# Patient Record
Sex: FEMALE | Race: WHITE | Employment: FULL TIME | URBAN - METROPOLITAN AREA
[De-identification: names, ages, dates, MRNs, and addresses within clinical notes are randomized per-mention and may not be internally consistent; named-entity substitution may affect disease eponyms.]

---

## 2017-01-09 ENCOUNTER — OFFICE VISIT (OUTPATIENT)
Dept: NEUROLOGY | Facility: CLINIC | Age: 52
End: 2017-01-09

## 2017-01-09 VITALS
HEIGHT: 66 IN | BODY MASS INDEX: 23.3 KG/M2 | HEART RATE: 76 BPM | SYSTOLIC BLOOD PRESSURE: 112 MMHG | WEIGHT: 145 LBS | RESPIRATION RATE: 16 BRPM | DIASTOLIC BLOOD PRESSURE: 60 MMHG

## 2017-01-09 DIAGNOSIS — M79.18 MYOFASCIAL PAIN SYNDROME, CERVICAL: Primary | ICD-10-CM

## 2017-01-09 PROCEDURE — 96372 THER/PROPH/DIAG INJ SC/IM: CPT | Performed by: OTHER

## 2017-01-09 PROCEDURE — 20553 NJX 1/MLT TRIGGER POINTS 3/>: CPT | Performed by: OTHER

## 2017-01-09 RX ORDER — BUPIVACAINE HYDROCHLORIDE 5 MG/ML
8 INJECTION, SOLUTION PERINEURAL ONCE
Status: COMPLETED | OUTPATIENT
Start: 2017-01-09 | End: 2017-01-09

## 2017-01-09 RX ORDER — TRIAMCINOLONE ACETONIDE 40 MG/ML
40 INJECTION, SUSPENSION INTRA-ARTICULAR; INTRAMUSCULAR ONCE
Status: COMPLETED | OUTPATIENT
Start: 2017-01-09 | End: 2017-01-09

## 2017-01-09 NOTE — PATIENT INSTRUCTIONS
Refill policies:    • Allow 2 business days for refills; controlled substances may take longer.   • Contact your pharmacy at least 5 days prior to running out of medication and have them send an electronic request or submit request through the “request re your physician has recommended that you have a procedure or additional testing performed. [facility] will contact your insurance carrier to obtain pre-certification or prior authorization.     Unfortunately, GREGORIO has seen an increas

## 2017-01-09 NOTE — PROGRESS NOTES
The trigger point injections have helped significantly and although the pain is much better in the neck and shoulder blades, the muscles in the occipitalis as tender and tight now    PROCEDURE NOTES: The patient was positioned in the most comfortable choic

## 2017-01-25 ENCOUNTER — TELEPHONE (OUTPATIENT)
Dept: NEUROLOGY | Facility: CLINIC | Age: 52
End: 2017-01-25

## 2017-01-26 PROCEDURE — 88377 M/PHMTRC ALYS ISHQUANT/SEMIQ: CPT | Performed by: RADIOLOGY

## 2017-01-26 PROCEDURE — 88305 TISSUE EXAM BY PATHOLOGIST: CPT | Performed by: RADIOLOGY

## 2017-01-26 PROCEDURE — 88360 TUMOR IMMUNOHISTOCHEM/MANUAL: CPT | Performed by: RADIOLOGY

## 2017-01-26 NOTE — TELEPHONE ENCOUNTER
Great news, tell her that we can do once every 2-4 weeks Trigger point series for 3-4 times if pain recurs.   Usually at the end of 3-4th injection, it is hoped the pain generator \"reset\" button shuts down the level of pain

## 2017-02-15 ENCOUNTER — OFFICE VISIT (OUTPATIENT)
Dept: NEUROLOGY | Facility: CLINIC | Age: 52
End: 2017-02-15

## 2017-02-15 VITALS
SYSTOLIC BLOOD PRESSURE: 107 MMHG | DIASTOLIC BLOOD PRESSURE: 60 MMHG | HEIGHT: 66.5 IN | BODY MASS INDEX: 23.03 KG/M2 | RESPIRATION RATE: 16 BRPM | WEIGHT: 145 LBS | HEART RATE: 77 BPM

## 2017-02-15 DIAGNOSIS — M79.18 MYOFASCIAL PAIN SYNDROME, CERVICAL: Primary | ICD-10-CM

## 2017-02-15 PROCEDURE — 20553 NJX 1/MLT TRIGGER POINTS 3/>: CPT | Performed by: OTHER

## 2017-02-15 PROCEDURE — 96372 THER/PROPH/DIAG INJ SC/IM: CPT | Performed by: OTHER

## 2017-02-15 RX ORDER — TRIAMCINOLONE ACETONIDE 40 MG/ML
40 INJECTION, SUSPENSION INTRA-ARTICULAR; INTRAMUSCULAR ONCE
Status: COMPLETED | OUTPATIENT
Start: 2017-02-15 | End: 2017-02-15

## 2017-02-15 RX ORDER — BUPIVACAINE HYDROCHLORIDE 5 MG/ML
8 INJECTION, SOLUTION PERINEURAL ONCE
Status: COMPLETED | OUTPATIENT
Start: 2017-02-15 | End: 2017-02-15

## 2017-02-15 NOTE — PROCEDURES
@EXB@  Procedure:  Trigger Point Injections  NOTES:   Improved after the last injection and wants to continue although pain is not as bad as pre-TPI    MTP identified right mid cervical upper trapezius and interscapular area,    /60 mmHg  Pulse 77  R

## 2017-02-15 NOTE — PATIENT INSTRUCTIONS
Refill policies:    • Allow 2 business days for refills; controlled substances may take longer.   • Contact your pharmacy at least 5 days prior to running out of medication and have them send an electronic request or submit request through the “request re your physician has recommended that you have a procedure or additional testing performed. [facility] will contact your insurance carrier to obtain pre-certification or prior authorization.     Unfortunately, GREGORIO has seen an increas

## 2017-04-14 ENCOUNTER — OFFICE VISIT (OUTPATIENT)
Dept: NEUROLOGY | Facility: CLINIC | Age: 52
End: 2017-04-14

## 2017-04-14 VITALS
SYSTOLIC BLOOD PRESSURE: 114 MMHG | RESPIRATION RATE: 16 BRPM | WEIGHT: 145 LBS | BODY MASS INDEX: 23.03 KG/M2 | DIASTOLIC BLOOD PRESSURE: 65 MMHG | HEART RATE: 97 BPM | HEIGHT: 66.5 IN

## 2017-04-14 DIAGNOSIS — M79.18 MYOFASCIAL PAIN SYNDROME, CERVICAL: Primary | ICD-10-CM

## 2017-04-14 PROCEDURE — 96372 THER/PROPH/DIAG INJ SC/IM: CPT | Performed by: OTHER

## 2017-04-14 PROCEDURE — 20553 NJX 1/MLT TRIGGER POINTS 3/>: CPT | Performed by: OTHER

## 2017-04-14 RX ORDER — BUPIVACAINE HYDROCHLORIDE 5 MG/ML
5 INJECTION, SOLUTION PERINEURAL ONCE
Status: COMPLETED | OUTPATIENT
Start: 2017-04-14 | End: 2017-04-14

## 2017-04-14 RX ORDER — TRIAMCINOLONE ACETONIDE 40 MG/ML
40 INJECTION, SUSPENSION INTRA-ARTICULAR; INTRAMUSCULAR ONCE
Status: COMPLETED | OUTPATIENT
Start: 2017-04-14 | End: 2017-04-14

## 2017-04-14 NOTE — PATIENT INSTRUCTIONS
Refill policies:    • Allow 2 business days for refills; controlled substances may take longer.   • Contact your pharmacy at least 5 days prior to running out of medication and have them send an electronic request or submit request through the “request re insurance carrier to obtain pre-certification or prior authorization. Unfortunately, GREGORIO has seen an increase in denial of payment even though the procedure/test has been pre-certified.   You are strongly encouraged to contact your insurance carrier to v

## 2017-04-14 NOTE — PROGRESS NOTES
@YWB@  Procedure:  Trigger Point Injections  NOTES: bilateral occipitalis muscle tenderness    /65 mmHg  Pulse 97  Resp 16  Ht 66.5\"  Wt 145 lb  BMI 23.06 kg/m2  PROCEDURE NOTES: The patient was positioned in the most comfortable choice optimal for

## 2017-06-07 RX ORDER — NORTRIPTYLINE HYDROCHLORIDE 10 MG/1
CAPSULE ORAL
Qty: 30 CAPSULE | Refills: 1 | Status: SHIPPED | OUTPATIENT
Start: 2017-06-07 | End: 2017-09-11

## 2017-06-07 NOTE — TELEPHONE ENCOUNTER
Medication: Nortiptyline    Date of last refill: 11/10/16  [place]    Last office visit: 04/14/17  Due back to clinic per last office note:  3 months  Date next office visit scheduled: No appointment scheduled at [facility]

## 2017-09-11 ENCOUNTER — TELEPHONE (OUTPATIENT)
Dept: NEUROLOGY | Facility: CLINIC | Age: 52
End: 2017-09-11

## 2017-09-11 DIAGNOSIS — M79.18 MYOFASCIAL PAIN SYNDROME, CERVICAL: Primary | ICD-10-CM

## 2017-09-11 RX ORDER — NORTRIPTYLINE HYDROCHLORIDE 10 MG/1
CAPSULE ORAL
Qty: 30 CAPSULE | Refills: 1 | Status: SHIPPED | OUTPATIENT
Start: 2017-09-11 | End: 2017-11-01

## 2017-09-11 NOTE — TELEPHONE ENCOUNTER
Called [facility] and spoke with John Perez for [address] [facility] codes are valid and billable no authorization or predetermination is needed. Call reference #[id number].  Time on call 28:53

## 2017-09-11 NOTE — TELEPHONE ENCOUNTER
Michelle Dad reports that she discontinued nortriptyline and had a \"big depression drop\". Unsure if was related to her stopping nortriptyline or co-morbid cancer issue.     She reports that her neck has started hurting again; would like to resume nortriptyline a

## 2017-09-12 ENCOUNTER — OFFICE VISIT (OUTPATIENT)
Dept: NEUROLOGY | Facility: CLINIC | Age: 52
End: 2017-09-12

## 2017-09-12 VITALS — SYSTOLIC BLOOD PRESSURE: 92 MMHG | HEART RATE: 80 BPM | RESPIRATION RATE: 18 BRPM | DIASTOLIC BLOOD PRESSURE: 56 MMHG

## 2017-09-12 DIAGNOSIS — M79.18 MYOFASCIAL PAIN SYNDROME, CERVICAL: Primary | ICD-10-CM

## 2017-09-12 PROCEDURE — 20553 NJX 1/MLT TRIGGER POINTS 3/>: CPT | Performed by: OTHER

## 2017-09-12 RX ORDER — TRIAMCINOLONE ACETONIDE 40 MG/ML
40 INJECTION, SUSPENSION INTRA-ARTICULAR; INTRAMUSCULAR ONCE
Status: COMPLETED | OUTPATIENT
Start: 2017-09-12 | End: 2017-09-12

## 2017-09-12 RX ADMIN — TRIAMCINOLONE ACETONIDE 40 MG: 40 INJECTION, SUSPENSION INTRA-ARTICULAR; INTRAMUSCULAR at 09:00:00

## 2017-09-12 NOTE — PATIENT INSTRUCTIONS
Refill policies:    • Allow 2-3 business days for refills; controlled substances may take longer.   • Contact your pharmacy at least 5 days prior to running out of medication and have them send an electronic request or submit request through the [facility] have a procedure or additional testing performed. [facility]) will contact your insurance carrier to obtain pre-certification or prior authorization.     Unfortunately, GREGORIO has seen an increase in denial of payment even though the p

## 2017-09-12 NOTE — PROGRESS NOTES
[facility]  9/12/2017    Followed for neck and head pains:    She had been doing relatively better after the last injections.   Specially the interscapular pains, these are all gone HOWEVER, she is experiencing pain in the back of the he complications noted or related by patient. Post TPI instructions given: watch out for signs of inflammation or infection and report right away.       Cecil Leonard MD  Vascular & General Neurology  Director, Multiple Sclerosis Program  [facility]

## 2017-11-01 DIAGNOSIS — M79.18 MYOFASCIAL PAIN SYNDROME, CERVICAL: ICD-10-CM

## 2017-11-01 RX ORDER — NORTRIPTYLINE HYDROCHLORIDE 10 MG/1
CAPSULE ORAL
Qty: 30 CAPSULE | Refills: 0 | Status: SHIPPED | OUTPATIENT
Start: 2017-11-01 | End: 2017-12-12

## 2017-11-01 NOTE — TELEPHONE ENCOUNTER
Medication: Nortriptyline 10 mg    Date of last refill: 09/11/17 with 1 addt refill  [place]     Last office visit: 9/12/2017  Due back to clinic per last office note:  RTN in 1 month  Date next office visit scheduled:

## 2017-11-14 ENCOUNTER — OFFICE VISIT (OUTPATIENT)
Dept: NEUROLOGY | Facility: CLINIC | Age: 52
End: 2017-11-14

## 2017-11-14 VITALS — HEART RATE: 80 BPM | DIASTOLIC BLOOD PRESSURE: 60 MMHG | RESPIRATION RATE: 16 BRPM | SYSTOLIC BLOOD PRESSURE: 90 MMHG

## 2017-11-14 DIAGNOSIS — M79.18 MYOFASCIAL PAIN SYNDROME, CERVICAL: Primary | ICD-10-CM

## 2017-11-14 PROCEDURE — 20553 NJX 1/MLT TRIGGER POINTS 3/>: CPT | Performed by: OTHER

## 2017-11-14 RX ORDER — NABUMETONE 750 MG/1
750 TABLET, FILM COATED ORAL 2 TIMES DAILY
Qty: 60 TABLET | Refills: 3 | Status: SHIPPED | OUTPATIENT
Start: 2017-11-14 | End: 2018-12-11

## 2017-11-14 RX ORDER — TAMOXIFEN CITRATE 20 MG/1
20 TABLET ORAL DAILY
Refills: 3 | COMMUNITY
Start: 2017-10-22 | End: 2020-01-02

## 2017-11-14 RX ORDER — CARVEDILOL 3.12 MG/1
TABLET ORAL
Refills: 11 | COMMUNITY
Start: 2017-10-24 | End: 2018-12-11

## 2017-11-14 RX ORDER — BUPIVACAINE HYDROCHLORIDE 5 MG/ML
8 INJECTION, SOLUTION PERINEURAL ONCE
Status: COMPLETED | OUTPATIENT
Start: 2017-11-14 | End: 2017-11-14

## 2017-11-14 RX ORDER — TRIAMCINOLONE ACETONIDE 40 MG/ML
40 INJECTION, SUSPENSION INTRA-ARTICULAR; INTRAMUSCULAR ONCE
Status: COMPLETED | OUTPATIENT
Start: 2017-11-14 | End: 2017-11-14

## 2017-11-14 NOTE — PROGRESS NOTES
[facility]  11/14/2017      Procedure:  Trigger Point Injections  NOTES:   Bilateral upper trapezius, bilateral interscapular area, suboccipital muscles and the occipitalis muscles bilaterally    BP 90/60   Pulse 80   Resp 16   PROCEDUR

## 2017-11-14 NOTE — PATIENT INSTRUCTIONS
Refill policies:    • Allow 2-3 business days for refills; controlled substances may take longer.   • Contact your pharmacy at least 5 days prior to running out of medication and have them send an electronic request or submit request through the [facility] have a procedure or additional testing performed. [facility]) will contact your insurance carrier to obtain pre-certification or prior authorization.     Unfortunately, GREGORIO has seen an increase in denial of payment even though the p

## 2017-12-12 DIAGNOSIS — M79.18 MYOFASCIAL PAIN SYNDROME, CERVICAL: ICD-10-CM

## 2017-12-12 RX ORDER — NORTRIPTYLINE HYDROCHLORIDE 10 MG/1
CAPSULE ORAL
Qty: 30 CAPSULE | Refills: 5 | Status: SHIPPED | OUTPATIENT
Start: 2017-12-12 | End: 2018-06-16

## 2017-12-12 NOTE — TELEPHONE ENCOUNTER
Medication: Nortriptyline 10 mg    Date of last refill: 11/01/17  [place]     Last office visit: 11/14/2017  Due back to clinic per last office note:  RTN in 1 month  Date next office visit scheduled:  Future Appointment

## 2017-12-13 ENCOUNTER — OFFICE VISIT (OUTPATIENT)
Dept: NEUROLOGY | Facility: CLINIC | Age: 52
End: 2017-12-13

## 2017-12-13 VITALS
BODY MASS INDEX: 23.03 KG/M2 | WEIGHT: 145 LBS | RESPIRATION RATE: 16 BRPM | SYSTOLIC BLOOD PRESSURE: 100 MMHG | DIASTOLIC BLOOD PRESSURE: 60 MMHG | HEART RATE: 72 BPM | HEIGHT: 66.5 IN

## 2017-12-13 DIAGNOSIS — M79.18 MYOFASCIAL PAIN SYNDROME, CERVICAL: Primary | ICD-10-CM

## 2017-12-13 PROCEDURE — 20553 NJX 1/MLT TRIGGER POINTS 3/>: CPT | Performed by: OTHER

## 2017-12-13 PROCEDURE — 97035 APP MDLTY 1+ULTRASOUND EA 15: CPT | Performed by: OTHER

## 2017-12-13 RX ORDER — LISINOPRIL 5 MG/1
5 TABLET ORAL DAILY
COMMUNITY
End: 2018-12-11

## 2017-12-13 RX ORDER — BUPIVACAINE HYDROCHLORIDE 5 MG/ML
8 INJECTION, SOLUTION PERINEURAL ONCE
Status: COMPLETED | OUTPATIENT
Start: 2017-12-13 | End: 2017-12-13

## 2017-12-13 RX ORDER — TRIAMCINOLONE ACETONIDE 40 MG/ML
40 INJECTION, SUSPENSION INTRA-ARTICULAR; INTRAMUSCULAR ONCE
Status: COMPLETED | OUTPATIENT
Start: 2017-12-13 | End: 2017-12-13

## 2017-12-13 NOTE — PROGRESS NOTES
[facility]  12/13/2017      Procedure:  Trigger Point Injections  NOTES:   Upper traps bilaterally and bilateral interscapular area    /60   Pulse 72   Resp 16   Ht 66.5\"   Wt 145 lb   BMI 23.05 kg/m²   PROCEDURE NOTES: The lorena

## 2017-12-13 NOTE — PATIENT INSTRUCTIONS
Refill policies:    • Allow 2-3 business days for refills; controlled substances may take longer.   • Contact your pharmacy at least 5 days prior to running out of medication and have them send an electronic request or submit request through the [facility] have a procedure or additional testing performed. [facility]) will contact your insurance carrier to obtain pre-certification or prior authorization.     Unfortunately, GREGORIO has seen an increase in denial of payment even though the p

## 2018-01-10 ENCOUNTER — OFFICE VISIT (OUTPATIENT)
Dept: NEUROLOGY | Facility: CLINIC | Age: 53
End: 2018-01-10

## 2018-01-10 VITALS
HEIGHT: 66.5 IN | DIASTOLIC BLOOD PRESSURE: 60 MMHG | RESPIRATION RATE: 16 BRPM | SYSTOLIC BLOOD PRESSURE: 100 MMHG | WEIGHT: 145 LBS | BODY MASS INDEX: 23.03 KG/M2 | HEART RATE: 72 BPM

## 2018-01-10 DIAGNOSIS — M79.18 MYOFASCIAL PAIN SYNDROME, CERVICAL: Primary | ICD-10-CM

## 2018-01-10 PROCEDURE — 20553 NJX 1/MLT TRIGGER POINTS 3/>: CPT | Performed by: OTHER

## 2018-01-10 RX ORDER — TRIAMCINOLONE ACETONIDE 40 MG/ML
40 INJECTION, SUSPENSION INTRA-ARTICULAR; INTRAMUSCULAR ONCE
Status: COMPLETED | OUTPATIENT
Start: 2018-01-10 | End: 2018-01-10

## 2018-01-10 RX ORDER — BUPIVACAINE HYDROCHLORIDE 5 MG/ML
8 INJECTION, SOLUTION PERINEURAL ONCE
Status: COMPLETED | OUTPATIENT
Start: 2018-01-10 | End: 2018-01-10

## 2018-01-10 RX ADMIN — TRIAMCINOLONE ACETONIDE 40 MG: 40 INJECTION, SUSPENSION INTRA-ARTICULAR; INTRAMUSCULAR at 11:50:00

## 2018-01-10 NOTE — PROCEDURES
[facility]  1/10/2018      Procedure:  Trigger Point Injections  NOTES:   Bilateral upper trapezius and right interscapular area    /60   Pulse 72   Resp 16   Ht 66.5\"   Wt 145 lb   BMI 23.05 kg/m²   PROCEDURE NOTES: The patient

## 2018-01-10 NOTE — PATIENT INSTRUCTIONS
Refill policies:    • Allow 2-3 business days for refills; controlled substances may take longer.   • Contact your pharmacy at least 5 days prior to running out of medication and have them send an electronic request or submit request through the [facility] have a procedure or additional testing performed. [facility]) will contact your insurance carrier to obtain pre-certification or prior authorization.     Unfortunately, GREGORIO has seen an increase in denial of payment even though the p

## 2018-05-25 ENCOUNTER — OFFICE VISIT (OUTPATIENT)
Dept: NEUROLOGY | Facility: CLINIC | Age: 53
End: 2018-05-25

## 2018-05-25 VITALS
HEIGHT: 66 IN | RESPIRATION RATE: 16 BRPM | HEART RATE: 72 BPM | BODY MASS INDEX: 23.78 KG/M2 | DIASTOLIC BLOOD PRESSURE: 60 MMHG | SYSTOLIC BLOOD PRESSURE: 100 MMHG | WEIGHT: 148 LBS

## 2018-05-25 DIAGNOSIS — M79.18 MYOFASCIAL PAIN SYNDROME, CERVICAL: Primary | ICD-10-CM

## 2018-05-25 PROCEDURE — 20553 NJX 1/MLT TRIGGER POINTS 3/>: CPT | Performed by: OTHER

## 2018-05-25 RX ORDER — TRIAMCINOLONE ACETONIDE 40 MG/ML
40 INJECTION, SUSPENSION INTRA-ARTICULAR; INTRAMUSCULAR ONCE
Status: COMPLETED | OUTPATIENT
Start: 2018-05-25 | End: 2018-05-25

## 2018-05-25 RX ORDER — BUPIVACAINE HYDROCHLORIDE 5 MG/ML
8 INJECTION, SOLUTION PERINEURAL ONCE
Status: COMPLETED | OUTPATIENT
Start: 2018-05-25 | End: 2018-05-25

## 2018-05-25 NOTE — PROCEDURES
[facility]  5/25/2018      Procedure:  Trigger Point Injections  NOTES:   Upper traps and interscapular area bilaterally    /60 (BP Location: Left arm, Patient Position: Sitting, Cuff Size: large)   Pulse 72   Resp 16   Ht 66\"

## 2018-06-16 DIAGNOSIS — M79.18 MYOFASCIAL PAIN SYNDROME, CERVICAL: ICD-10-CM

## 2018-06-18 RX ORDER — NORTRIPTYLINE HYDROCHLORIDE 10 MG/1
CAPSULE ORAL
Qty: 30 CAPSULE | Refills: 0 | OUTPATIENT
Start: 2018-06-18

## 2018-06-18 RX ORDER — NORTRIPTYLINE HYDROCHLORIDE 10 MG/1
CAPSULE ORAL
Qty: 30 CAPSULE | Refills: 5 | Status: SHIPPED | OUTPATIENT
Start: 2018-06-18 | End: 2018-12-11

## 2018-06-18 NOTE — TELEPHONE ENCOUNTER
Medication: Nortriptyline 10 mg    Date of last refill: 12/12/17 with 5 addt refills  [place]     Last office visit: 5/25/2018  Due back to clinic per last office note:  No appt noted  Date next office visit scheduled:

## 2018-07-13 DIAGNOSIS — M79.18 MYOFASCIAL PAIN SYNDROME, CERVICAL: ICD-10-CM

## 2018-07-16 RX ORDER — NORTRIPTYLINE HYDROCHLORIDE 10 MG/1
CAPSULE ORAL
Qty: 30 CAPSULE | Refills: 0 | OUTPATIENT
Start: 2018-07-16

## 2018-12-11 DIAGNOSIS — M79.18 MYOFASCIAL PAIN SYNDROME, CERVICAL: ICD-10-CM

## 2018-12-11 PROBLEM — M85.80 OSTEOPENIA, UNSPECIFIED LOCATION: Status: ACTIVE | Noted: 2018-12-11

## 2018-12-11 RX ORDER — NORTRIPTYLINE HYDROCHLORIDE 10 MG/1
CAPSULE ORAL
Qty: 30 CAPSULE | Refills: 5 | Status: SHIPPED | OUTPATIENT
Start: 2018-12-11 | End: 2018-12-11

## 2018-12-11 NOTE — TELEPHONE ENCOUNTER
Medication: Nortriptyline 10 mg     Date of last refill: 06/18/18 with 5 addt refills  [place]      Last office visit: 5/25/2018  Due back to clinic per last office note:  No appt noted  Date next office visit scheduled:

## 2019-06-18 DIAGNOSIS — M79.18 MYOFASCIAL PAIN SYNDROME, CERVICAL: ICD-10-CM

## 2019-06-18 RX ORDER — NORTRIPTYLINE HYDROCHLORIDE 10 MG/1
CAPSULE ORAL
Qty: 30 CAPSULE | Refills: 0 | Status: SHIPPED | OUTPATIENT
Start: 2019-06-18 | End: 2019-07-22

## 2019-07-22 DIAGNOSIS — M79.18 MYOFASCIAL PAIN SYNDROME, CERVICAL: ICD-10-CM

## 2019-07-22 RX ORDER — NORTRIPTYLINE HYDROCHLORIDE 10 MG/1
CAPSULE ORAL
Qty: 30 CAPSULE | Refills: 0 | Status: SHIPPED | OUTPATIENT
Start: 2019-07-22 | End: 2019-08-21

## 2019-08-12 ENCOUNTER — TELEPHONE (OUTPATIENT)
Dept: NEUROLOGY | Facility: CLINIC | Age: 54
End: 2019-08-12

## 2019-08-21 DIAGNOSIS — M79.18 MYOFASCIAL PAIN SYNDROME, CERVICAL: ICD-10-CM

## 2019-08-21 RX ORDER — NORTRIPTYLINE HYDROCHLORIDE 10 MG/1
CAPSULE ORAL
Qty: 30 CAPSULE | Refills: 0 | Status: SHIPPED | OUTPATIENT
Start: 2019-08-21 | End: 2019-08-23

## 2019-08-21 NOTE — TELEPHONE ENCOUNTER
Medication: NORTRIPTYLINE HCL 10 MG Oral Cap    Date of last refill: 07/22/2019 (#30/0)  [place]     Last office visit: 05/25/2018  Due back to clinic per last office note:    Date next office visit scheduled:  08/23/201

## 2019-08-21 NOTE — TELEPHONE ENCOUNTER
Talked with Mickey Cisse, she is going to schedule a follow up with Dr KEMP. She had an appt this Friday, which was cancelled by our office. Will provide with one month refill until next schedule apt. Patient verbalized the understanding.

## 2019-08-23 ENCOUNTER — OFFICE VISIT (OUTPATIENT)
Dept: NEUROLOGY | Facility: CLINIC | Age: 54
End: 2019-08-23
Payer: COMMERCIAL

## 2019-08-23 VITALS
RESPIRATION RATE: 16 BRPM | SYSTOLIC BLOOD PRESSURE: 100 MMHG | DIASTOLIC BLOOD PRESSURE: 60 MMHG | HEIGHT: 66 IN | BODY MASS INDEX: 24.91 KG/M2 | WEIGHT: 155 LBS | HEART RATE: 74 BPM

## 2019-08-23 DIAGNOSIS — M79.18 MYOFASCIAL PAIN SYNDROME, CERVICAL: Primary | ICD-10-CM

## 2019-08-23 DIAGNOSIS — G43.009 MIGRAINE WITHOUT AURA AND WITHOUT STATUS MIGRAINOSUS, NOT INTRACTABLE: ICD-10-CM

## 2019-08-23 DIAGNOSIS — M50.90 CERVICAL DISC DISEASE: ICD-10-CM

## 2019-08-23 PROCEDURE — 99214 OFFICE O/P EST MOD 30 MIN: CPT | Performed by: PHYSICIAN ASSISTANT

## 2019-08-23 RX ORDER — PROPRANOLOL HYDROCHLORIDE 20 MG/1
20 TABLET ORAL AS NEEDED
COMMUNITY
End: 2020-07-20 | Stop reason: ALTCHOICE

## 2019-08-23 RX ORDER — SUMATRIPTAN 100 MG/1
TABLET, FILM COATED ORAL
Qty: 9 TABLET | Refills: 0 | Status: SHIPPED | OUTPATIENT
Start: 2019-08-23 | End: 2020-01-02

## 2019-08-23 RX ORDER — NORTRIPTYLINE HYDROCHLORIDE 10 MG/1
CAPSULE ORAL
Qty: 90 CAPSULE | Refills: 3 | Status: SHIPPED | OUTPATIENT
Start: 2019-08-23 | End: 2020-09-09

## 2019-08-23 NOTE — PROGRESS NOTES
[facility] with Lakhwinder Cummings  10/18/1965  Primary Care Provider:  Milan Frost MD    8/23/2019  Accompanied visit: No       48year old female patient being seen for: Cervical OTHER DISEASES     endometriosis         Medications:      Current Outpatient Medications:   •  Propranolol HCl 20 MG Oral Tab, Take 20 mg by mouth as needed. , Disp: , Rfl:   •  SUMAtriptan Succinate 100 MG Oral Tab, Use at onset; repeat once after 2 HRS-O necessary. Return in about 1 year (around 8/23/2020) for EMG.       Sharri Lopez PA-C  [facility]  8/23/2019, Time completed 10:26 AM

## 2019-08-29 ENCOUNTER — OFFICE VISIT (OUTPATIENT)
Dept: PAIN CLINIC | Facility: CLINIC | Age: 54
End: 2019-08-29
Payer: COMMERCIAL

## 2019-08-29 VITALS
HEART RATE: 97 BPM | OXYGEN SATURATION: 94 % | DIASTOLIC BLOOD PRESSURE: 60 MMHG | BODY MASS INDEX: 24.75 KG/M2 | SYSTOLIC BLOOD PRESSURE: 100 MMHG | HEIGHT: 66 IN | WEIGHT: 154 LBS

## 2019-08-29 DIAGNOSIS — M79.18 MYOFASCIAL PAIN SYNDROME, CERVICAL: Primary | ICD-10-CM

## 2019-08-29 PROCEDURE — 20553 NJX 1/MLT TRIGGER POINTS 3/>: CPT | Performed by: ANESTHESIOLOGY

## 2019-08-29 RX ORDER — BUPIVACAINE HYDROCHLORIDE 5 MG/ML
9 INJECTION, SOLUTION PERINEURAL ONCE
Status: COMPLETED | OUTPATIENT
Start: 2019-08-29 | End: 2019-08-29

## 2019-08-29 RX ORDER — METHYLPREDNISOLONE ACETATE 40 MG/ML
40 INJECTION, SUSPENSION INTRA-ARTICULAR; INTRALESIONAL; INTRAMUSCULAR; SOFT TISSUE ONCE
Status: COMPLETED | OUTPATIENT
Start: 2019-08-29 | End: 2019-08-29

## 2019-08-29 NOTE — PROGRESS NOTES
Patient presents in office today with reported pain in neck and shoulders    Current pain level reported = 7/10

## 2019-08-29 NOTE — PROCEDURES
Date of procedure: August 29, 2019    Preop diagnosis: Cervical myofascial pain syndrome    Postop diagnosis: Same    Procedure: Bilateral cervical paravertebral trigger point injections    Surgeon: Nini Brown    Anesthesia: None    EBL: 0    Indication: T

## 2019-08-29 NOTE — PROGRESS NOTES
Timeout completed prior to procedure @ 1:29 PM .  Participants present for timeout:  Dr. Jayshree Guevara , and patient.

## 2020-01-01 ENCOUNTER — EXTERNAL RECORD (OUTPATIENT)
Dept: OTHER | Age: 55
End: 2020-01-01

## 2020-02-15 DIAGNOSIS — M79.18 MYOFASCIAL PAIN SYNDROME, CERVICAL: ICD-10-CM

## 2020-02-17 RX ORDER — NORTRIPTYLINE HYDROCHLORIDE 10 MG/1
CAPSULE ORAL
Qty: 30 CAPSULE | Refills: 0 | OUTPATIENT
Start: 2020-02-17

## 2020-02-17 NOTE — TELEPHONE ENCOUNTER
Medication: Nortriptyline 10 mg    Date of last refill: 08/23/2019 with 3 adt refills # 80        Last office visit: 08/23/2019  Due back to clinic per last office note:  RTN in 1 year   Date next office visit scheduled:  No future appointments.     Last OV

## 2020-04-27 NOTE — TELEPHONE ENCOUNTER
Medication: NORTRIPTYLINE HCL 10 MG Oral Cap    Date of last refill: 06/18/2019 (#30/0)  [place]     Last office visit: 05/25/2018  Due back to clinic per last office note:    Date next office visit scheduled:  No future oral

## 2020-09-06 DIAGNOSIS — M79.18 MYOFASCIAL PAIN SYNDROME, CERVICAL: ICD-10-CM

## 2020-09-08 NOTE — TELEPHONE ENCOUNTER
Pt has not been seen in over 1 year. Spoke with patient and she was agreeable to scheduling a f/u. Appt made for 9/10/20. She does not need Rx before appt. Will hold until then.     Medication: NORTRIPTYLINE HCL 10 MG Oral Cap    Date of last refill: 8/

## 2020-09-09 RX ORDER — NORTRIPTYLINE HYDROCHLORIDE 10 MG/1
CAPSULE ORAL
Qty: 90 CAPSULE | Refills: 0 | Status: SHIPPED | OUTPATIENT
Start: 2020-09-09 | End: 2021-11-05

## 2020-09-10 ENCOUNTER — OFFICE VISIT (OUTPATIENT)
Dept: NEUROLOGY | Facility: CLINIC | Age: 55
End: 2020-09-10
Payer: COMMERCIAL

## 2020-09-10 VITALS
TEMPERATURE: 98 F | SYSTOLIC BLOOD PRESSURE: 126 MMHG | HEART RATE: 82 BPM | BODY MASS INDEX: 27.64 KG/M2 | HEIGHT: 66 IN | WEIGHT: 172 LBS | DIASTOLIC BLOOD PRESSURE: 78 MMHG | RESPIRATION RATE: 16 BRPM

## 2020-09-10 DIAGNOSIS — M50.90 CERVICAL DISC DISEASE: ICD-10-CM

## 2020-09-10 DIAGNOSIS — G43.009 MIGRAINE WITHOUT AURA AND WITHOUT STATUS MIGRAINOSUS, NOT INTRACTABLE: ICD-10-CM

## 2020-09-10 DIAGNOSIS — M79.18 MYOFASCIAL PAIN SYNDROME, CERVICAL: Primary | ICD-10-CM

## 2020-09-10 PROCEDURE — 3078F DIAST BP <80 MM HG: CPT | Performed by: OTHER

## 2020-09-10 PROCEDURE — 3074F SYST BP LT 130 MM HG: CPT | Performed by: OTHER

## 2020-09-10 PROCEDURE — 3008F BODY MASS INDEX DOCD: CPT | Performed by: OTHER

## 2020-09-10 PROCEDURE — 99214 OFFICE O/P EST MOD 30 MIN: CPT | Performed by: OTHER

## 2020-09-10 RX ORDER — LETROZOLE 2.5 MG/1
2.5 TABLET, FILM COATED ORAL DAILY
COMMUNITY

## 2020-09-10 RX ORDER — NORTRIPTYLINE HYDROCHLORIDE 25 MG/1
25 CAPSULE ORAL NIGHTLY
Qty: 30 CAPSULE | Refills: 5 | Status: SHIPPED | OUTPATIENT
Start: 2020-09-10 | End: 2021-03-23

## 2020-09-10 NOTE — PROGRESS NOTES
[facility] with Lakhwinder Cummings  10/18/1965  Primary Care Provider:  Tenzin Cowan MD    9/10/2020  Accompanied visit:     (x) No.        47year old yo patient being seen for:  He without aura and without status migrainosus, not intractable    Discussion plus Diagnostics & Treatment Orders:  Try higher dose of Nortriptyline to 25 mg HS  This will probably help with the joint and muscle pains.   We are going to reserve trigger point i

## 2020-09-14 ENCOUNTER — LAB SERVICES (OUTPATIENT)
Dept: OTHER | Age: 55
End: 2020-09-14

## 2020-09-14 ENCOUNTER — HOSPITAL (OUTPATIENT)
Dept: OTHER | Age: 55
End: 2020-09-14
Attending: INTERNAL MEDICINE

## 2020-09-14 ENCOUNTER — HOSPITAL (OUTPATIENT)
Dept: OTHER | Age: 55
End: 2020-09-14

## 2020-09-14 LAB
A/G RATIO_: 1
A/G RATIO_: 1
ABS LYMPH: 2.9 K/CUMM (ref 1–3.5)
ABS LYMPH: 2.9 K/CUMM (ref 1–3.5)
ABS MONO: 0.6 K/CUMM (ref 0.1–0.8)
ABS MONO: 0.6 K/CUMM (ref 0.1–0.8)
ABS NEUTRO: 3.8 K/CUMM (ref 2–8)
ABS NEUTRO: 3.8 K/CUMM (ref 2–8)
ALBUMIN: 3.8 G/DL (ref 3.5–5)
ALBUMIN: 3.8 G/DL (ref 3.5–5)
ALK PHOS: 112 UNIT/L (ref 50–124)
ALK PHOS: 112 UNIT/L (ref 50–124)
ALT/GPT: 16 UNIT/L (ref 0–55)
ALT/GPT: 16 UNIT/L (ref 0–55)
ANION GAP SERPL CALC-SCNC: 12 MEQ/L (ref 10–20)
ANION GAP SERPL CALC-SCNC: 12 MEQ/L (ref 10–20)
AST/GOT: 17 UNIT/L (ref 5–34)
AST/GOT: 17 UNIT/L (ref 5–34)
BASOPHIL: 0 % (ref 0–1)
BASOPHIL: 0 % (ref 0–1)
BILI TOTAL: 0.3 MG/DL (ref 0.2–1)
BILI TOTAL: 0.3 MG/DL (ref 0.2–1)
BUN SERPL-MCNC: 13 MG/DL (ref 6–20)
BUN SERPL-MCNC: 13 MG/DL (ref 6–20)
CALCIUM: 9.4 MG/DL (ref 8.4–10.2)
CALCIUM: 9.4 MG/DL (ref 8.4–10.2)
CHLORIDE: 103 MEQ/L (ref 97–107)
CHLORIDE: 103 MEQ/L (ref 97–107)
CREATININE: 0.87 MG/DL (ref 0.6–1.3)
CREATININE: 0.87 MG/DL (ref 0.6–1.3)
DIFF_TYPE?: NORMAL
EOSINOPHIL: 2 % (ref 0–6)
EOSINOPHIL: 2 % (ref 0–6)
GLOBULIN_: 3.7 G/DL (ref 2–4.1)
GLOBULIN_: 3.7 G/DL (ref 2–4.1)
GLUCOSE LVL: 99 MG/DL (ref 70–99)
GLUCOSE LVL: 99 MG/DL (ref 70–99)
HCT VFR BLD CALC: 35 % (ref 33–45)
HCT VFR BLD CALC: 35 % (ref 33–45)
HEMOLYSIS 2+: NEGATIVE
HGB BLD-MCNC: 11.9 G/DL (ref 11–15)
HGB BLD-MCNC: 11.9 G/DL (ref 11–15)
ICTERIC 4+: NEGATIVE
IMMATURE GRAN: 0.3 % (ref 0–0.3)
IMMATURE GRAN: 0.3 % (ref 0–0.3)
INSTR WBC: 7.6 K/CUMM (ref 4–11)
LIPASE LEVEL: 28 UNIT/L (ref 8–78)
LIPASE LEVEL: 28 UNIT/L (ref 8–78)
LIPEMIC 3+: NEGATIVE
LYMPHOCYTE: 39 %
LYMPHOCYTE: 39 %
MCH RBC QN AUTO: 30 PG (ref 25–35)
MCH RBC QN AUTO: 30 PG (ref 25–35)
MCHC RBC AUTO-ENTMCNC: 34 G/DL (ref 32–37)
MCHC RBC AUTO-ENTMCNC: 34 G/DL (ref 32–37)
MCV RBC AUTO: 89 FL (ref 78–97)
MCV RBC AUTO: 89 FL (ref 78–97)
MONOCYTE: 8 %
MONOCYTE: 8 %
NEUTROPHIL: 50 %
NEUTROPHIL: 50 %
NRBC BLD MANUAL-RTO: 0 % (ref 0–0.2)
NRBC BLD MANUAL-RTO: 0 % (ref 0–0.2)
PLATELET: 210 K/CUMM (ref 150–450)
PLATELET: 210 K/CUMM (ref 150–450)
POTASSIUM: 3.9 MEQ/L (ref 3.5–5.1)
POTASSIUM: 3.9 MEQ/L (ref 3.5–5.1)
RAPID COV19 RESULT (SHERMAN): NOT DETECTED
RAPID INTERP (SHERMAN): NORMAL
RBC # BLD: 3.99 M/CUMM (ref 3.7–5.2)
RBC # BLD: 3.99 M/CUMM (ref 3.7–5.2)
RDW: 13.7 % (ref 11.5–14.5)
RDW: 13.7 % (ref 11.5–14.5)
SARS-COV-2 RNA SPEC QL NAA+PROBE: NOT DETECTED
SODIUM: 137 MEQ/L (ref 136–145)
SODIUM: 137 MEQ/L (ref 136–145)
SPECIMEN SOURCE: NORMAL
SURG SPECIMEN: NORMAL
TCO2: 26 MEQ/L (ref 19–29)
TCO2: 26 MEQ/L (ref 19–29)
TOTAL PROTEIN: 7.5 G/DL (ref 6.4–8.3)
TOTAL PROTEIN: 7.5 G/DL (ref 6.4–8.3)
UA APPEAR: CLEAR
UA APPEAR: CLEAR
UA BACTERIA: ABNORMAL
UA BACTERIA: ABNORMAL
UA BILI: NEGATIVE
UA BILI: NEGATIVE
UA BLOOD: ABNORMAL
UA BLOOD: ABNORMAL
UA COLOR: YELLOW
UA COLOR: YELLOW
UA EPITHELIAL: ABNORMAL
UA EPITHELIAL: ABNORMAL
UA GLUCOSE: NEGATIVE
UA GLUCOSE: NEGATIVE
UA KETONES: NEGATIVE
UA KETONES: NEGATIVE
UA LEUK EST: NEGATIVE
UA LEUK EST: NEGATIVE
UA NITRITE: NEGATIVE
UA NITRITE: NEGATIVE
UA PH: 5.5 (ref 5–7)
UA PH: 5.5 (ref 5–7)
UA PROTEIN: NEGATIVE
UA PROTEIN: NEGATIVE
UA RBC: ABNORMAL
UA RBC: ABNORMAL
UA SPEC GRAV: <=1.005 (ref 1.01–1.02)
UA SPEC GRAV: <=1.005 (ref 1.01–1.02)
UA UROBILINOGEN: 0.2 MG/DL (ref 0.2–1)
UA UROBILINOGEN: 0.2 MG/DL (ref 0.2–1)
UA WBC: ABNORMAL
UA WBC: ABNORMAL
WBC # BLD: 7.6 K/CUMM (ref 4–11)
WBC # BLD: 7.6 K/CUMM (ref 4–11)

## 2020-09-14 PROCEDURE — 93010 ELECTROCARDIOGRAM REPORT: CPT | Performed by: INTERNAL MEDICINE

## 2020-09-14 PROCEDURE — 99219 INITIAL OBSERVATION CARE,LEVL II: CPT | Performed by: INTERNAL MEDICINE

## 2020-09-15 LAB
ABS LYMPH: 1.9 K/CUMM (ref 1–3.5)
ABS LYMPH: 1.9 K/CUMM (ref 1–3.5)
ABS MONO: 0.6 K/CUMM (ref 0.1–0.8)
ABS MONO: 0.6 K/CUMM (ref 0.1–0.8)
ABS NEUTRO: 4.3 K/CUMM (ref 2–8)
ABS NEUTRO: 4.3 K/CUMM (ref 2–8)
ANION GAP SERPL CALC-SCNC: 10 MEQ/L (ref 10–20)
ANION GAP SERPL CALC-SCNC: 10 MEQ/L (ref 10–20)
BASOPHIL: 0 % (ref 0–1)
BASOPHIL: 0 % (ref 0–1)
BUN SERPL-MCNC: 9 MG/DL (ref 6–20)
BUN SERPL-MCNC: 9 MG/DL (ref 6–20)
CALCIUM: 8.7 MG/DL (ref 8.4–10.2)
CALCIUM: 8.7 MG/DL (ref 8.4–10.2)
CHLORIDE: 106 MEQ/L (ref 97–107)
CHLORIDE: 106 MEQ/L (ref 97–107)
CREATININE: 0.84 MG/DL (ref 0.6–1.3)
CREATININE: 0.84 MG/DL (ref 0.6–1.3)
DIFF_TYPE?: ABNORMAL
EOSINOPHIL: 2 % (ref 0–6)
EOSINOPHIL: 2 % (ref 0–6)
GLUCOSE LVL: 89 MG/DL (ref 70–99)
GLUCOSE LVL: 89 MG/DL (ref 70–99)
HCT VFR BLD CALC: 33 % (ref 33–45)
HCT VFR BLD CALC: 33 % (ref 33–45)
HEMOLYSIS 2+: NEGATIVE
HGB BLD-MCNC: 10.6 G/DL (ref 11–15)
HGB BLD-MCNC: 10.6 G/DL (ref 11–15)
IMMATURE GRAN: 0.4 % (ref 0–0.3)
IMMATURE GRAN: 0.4 % (ref 0–0.3)
INSTR WBC: 7 K/CUMM (ref 4–11)
LYMPHOCYTE: 27 %
LYMPHOCYTE: 27 %
MCH RBC QN AUTO: 30 PG (ref 25–35)
MCH RBC QN AUTO: 30 PG (ref 25–35)
MCHC RBC AUTO-ENTMCNC: 32 G/DL (ref 32–37)
MCHC RBC AUTO-ENTMCNC: 32 G/DL (ref 32–37)
MCV RBC AUTO: 91 FL (ref 78–97)
MCV RBC AUTO: 91 FL (ref 78–97)
MONOCYTE: 9 %
MONOCYTE: 9 %
NEUTROPHIL: 62 %
NEUTROPHIL: 62 %
NRBC BLD MANUAL-RTO: 0 % (ref 0–0.2)
NRBC BLD MANUAL-RTO: 0 % (ref 0–0.2)
PLATELET: 174 K/CUMM (ref 150–450)
PLATELET: 174 K/CUMM (ref 150–450)
POTASSIUM: 4.2 MEQ/L (ref 3.5–5.1)
POTASSIUM: 4.2 MEQ/L (ref 3.5–5.1)
RBC # BLD: 3.58 M/CUMM (ref 3.7–5.2)
RBC # BLD: 3.58 M/CUMM (ref 3.7–5.2)
RDW: 13.9 % (ref 11.5–14.5)
RDW: 13.9 % (ref 11.5–14.5)
REPORT TEXT: NORMAL
SODIUM: 139 MEQ/L (ref 136–145)
SODIUM: 139 MEQ/L (ref 136–145)
TCO2: 27 MEQ/L (ref 19–29)
TCO2: 27 MEQ/L (ref 19–29)
WBC # BLD: 7 K/CUMM (ref 4–11)
WBC # BLD: 7 K/CUMM (ref 4–11)

## 2020-09-15 PROCEDURE — 99217 OBSERVATION CARE DISCHARGE: CPT | Performed by: STUDENT IN AN ORGANIZED HEALTH CARE EDUCATION/TRAINING PROGRAM

## 2020-09-16 LAB — PATHOLOGIST NAME: NORMAL

## 2020-09-23 RX ORDER — NORTRIPTYLINE HYDROCHLORIDE 25 MG/1
CAPSULE ORAL
Qty: 30 CAPSULE | Refills: 5 | OUTPATIENT
Start: 2020-09-23

## 2021-03-23 RX ORDER — NORTRIPTYLINE HYDROCHLORIDE 25 MG/1
CAPSULE ORAL
Qty: 90 CAPSULE | Refills: 0 | Status: SHIPPED | OUTPATIENT
Start: 2021-03-23 | End: 2021-06-21

## 2021-03-23 NOTE — TELEPHONE ENCOUNTER
Medication: Nortriptyline 25 mg    Date of last refill: 09//10/20 with 5 addt refills  [place]     Last office visit: 09/10/20  Due back to clinic per last office note: RTN in 6 months  Date next office visit scheduled:

## 2021-06-19 DIAGNOSIS — G43.009 MIGRAINE WITHOUT AURA AND WITHOUT STATUS MIGRAINOSUS, NOT INTRACTABLE: Primary | ICD-10-CM

## 2021-06-21 RX ORDER — NORTRIPTYLINE HYDROCHLORIDE 25 MG/1
CAPSULE ORAL
Qty: 90 CAPSULE | Refills: 0 | Status: SHIPPED | OUTPATIENT
Start: 2021-06-21 | End: 2021-09-29

## 2021-06-21 NOTE — TELEPHONE ENCOUNTER
Medication: NORTRIPTYLINE HCL 25 MG Oral Cap    Date of last refill: 3/23/2021 (#90/0)  [place] 0/28/6760    Last office visit: 9/10/2020  Due back to clinic per last office note:  6 months  Date next office visit schedu

## 2021-09-29 DIAGNOSIS — G43.009 MIGRAINE WITHOUT AURA AND WITHOUT STATUS MIGRAINOSUS, NOT INTRACTABLE: ICD-10-CM

## 2021-09-29 RX ORDER — NORTRIPTYLINE HYDROCHLORIDE 25 MG/1
CAPSULE ORAL
Qty: 90 CAPSULE | Refills: 0 | Status: SHIPPED | OUTPATIENT
Start: 2021-09-29 | End: 2021-11-05

## 2021-09-29 NOTE — TELEPHONE ENCOUNTER
Medication: NORTRIPTYLINE HCL 25 MG Oral Cap     Date of last refill: 06/21/21  [place]     Last office visit: 9/10/2020  Due back to clinic per last office note:  6 months  Date next office visit scheduled:  No future a

## 2021-11-05 ENCOUNTER — OFFICE VISIT (OUTPATIENT)
Dept: NEUROLOGY | Facility: CLINIC | Age: 56
End: 2021-11-05
Payer: COMMERCIAL

## 2021-11-05 ENCOUNTER — TELEPHONE (OUTPATIENT)
Dept: NEUROLOGY | Facility: CLINIC | Age: 56
End: 2021-11-05

## 2021-11-05 VITALS
HEART RATE: 86 BPM | DIASTOLIC BLOOD PRESSURE: 75 MMHG | SYSTOLIC BLOOD PRESSURE: 120 MMHG | HEIGHT: 66 IN | BODY MASS INDEX: 27.64 KG/M2 | RESPIRATION RATE: 16 BRPM | WEIGHT: 172 LBS

## 2021-11-05 DIAGNOSIS — G43.009 MIGRAINE WITHOUT AURA AND WITHOUT STATUS MIGRAINOSUS, NOT INTRACTABLE: Primary | ICD-10-CM

## 2021-11-05 DIAGNOSIS — M79.18 MYOFASCIAL PAIN SYNDROME, CERVICAL: ICD-10-CM

## 2021-11-05 DIAGNOSIS — M50.90 CERVICAL DISC DISEASE: ICD-10-CM

## 2021-11-05 DIAGNOSIS — M79.18 MYOFASCIAL PAIN SYNDROME, CERVICAL: Primary | ICD-10-CM

## 2021-11-05 PROCEDURE — 20553 NJX 1/MLT TRIGGER POINTS 3/>: CPT | Performed by: OTHER

## 2021-11-05 PROCEDURE — 3008F BODY MASS INDEX DOCD: CPT | Performed by: OTHER

## 2021-11-05 PROCEDURE — 3074F SYST BP LT 130 MM HG: CPT | Performed by: OTHER

## 2021-11-05 PROCEDURE — 3078F DIAST BP <80 MM HG: CPT | Performed by: OTHER

## 2021-11-05 PROCEDURE — 99213 OFFICE O/P EST LOW 20 MIN: CPT | Performed by: OTHER

## 2021-11-05 RX ORDER — TRIAMCINOLONE ACETONIDE 40 MG/ML
40 INJECTION, SUSPENSION INTRA-ARTICULAR; INTRAMUSCULAR ONCE
Status: COMPLETED | OUTPATIENT
Start: 2021-11-05 | End: 2021-11-05

## 2021-11-05 RX ORDER — UBROGEPANT 100 MG/1
TABLET ORAL
Qty: 10 TABLET | Refills: 0 | Status: SHIPPED | OUTPATIENT
Start: 2021-11-05 | End: 2021-12-05

## 2021-11-05 RX ORDER — BUPIVACAINE HYDROCHLORIDE 2.5 MG/ML
8 INJECTION, SOLUTION EPIDURAL; INFILTRATION; INTRACAUDAL ONCE
Status: COMPLETED | OUTPATIENT
Start: 2021-11-05 | End: 2021-11-05

## 2021-11-05 RX ORDER — UBROGEPANT 100 MG/1
TABLET ORAL
Qty: 10 TABLET | Refills: 5 | Status: SHIPPED | OUTPATIENT
Start: 2021-11-05

## 2021-11-05 RX ORDER — NORTRIPTYLINE HYDROCHLORIDE 25 MG/1
25 CAPSULE ORAL NIGHTLY
Qty: 90 CAPSULE | Refills: 3 | Status: SHIPPED | OUTPATIENT
Start: 2021-11-05

## 2021-11-05 NOTE — PROGRESS NOTES
[facility]  11/5/2021      Procedure:  Trigger Point Injections  NOTES:   Bilateral upper paracervical, upper traps, and interscapular areas    /75 (BP Location: Left arm, Patient Position: Sitting, Cuff Size: adult)   Pulse 86

## 2021-11-05 NOTE — PROGRESS NOTES
[employer] [facility]  10/18/1965  Primary Care Provider:  Karyle Cheng, MD    11/5/2021  Accompanied visit:      (x) No.      64year old yo patient being seen for:  Chr encounter diagnosis)  Myofascial pain syndrome, cervical  Cervical disc disease      Discussion plus Diagnostics & Treatment Orders:  Proceed with TPI  Samples of UBRELVY given  Rx sent        (x) Discussed potential side effects of any treatment relevant

## 2021-11-05 NOTE — TELEPHONE ENCOUNTER
Called [facility] [phone] and spoke with Jenna Nation. Codes 10918,93273 are valid and billable for Dx M79.18. No PA or Predetermination required in office setting    Call reference #[id number]. Time on call 8:01      Also per Kaela.  Patient has meet her deduc

## 2021-11-22 ENCOUNTER — OFFICE VISIT (OUTPATIENT)
Dept: NEUROLOGY | Facility: CLINIC | Age: 56
End: 2021-11-22
Payer: COMMERCIAL

## 2021-11-22 VITALS
HEART RATE: 68 BPM | RESPIRATION RATE: 16 BRPM | SYSTOLIC BLOOD PRESSURE: 126 MMHG | DIASTOLIC BLOOD PRESSURE: 60 MMHG | BODY MASS INDEX: 28.28 KG/M2 | WEIGHT: 176 LBS | HEIGHT: 66 IN

## 2021-11-22 DIAGNOSIS — M79.18 MYOFASCIAL PAIN SYNDROME, CERVICAL: Primary | ICD-10-CM

## 2021-11-22 PROCEDURE — 3078F DIAST BP <80 MM HG: CPT | Performed by: OTHER

## 2021-11-22 PROCEDURE — 20553 NJX 1/MLT TRIGGER POINTS 3/>: CPT | Performed by: OTHER

## 2021-11-22 PROCEDURE — 3074F SYST BP LT 130 MM HG: CPT | Performed by: OTHER

## 2021-11-22 PROCEDURE — 3008F BODY MASS INDEX DOCD: CPT | Performed by: OTHER

## 2021-11-22 RX ORDER — TRIAMCINOLONE ACETONIDE 40 MG/ML
40 INJECTION, SUSPENSION INTRA-ARTICULAR; INTRAMUSCULAR ONCE
Status: COMPLETED | OUTPATIENT
Start: 2021-11-22 | End: 2021-11-22

## 2021-11-22 RX ORDER — BUPIVACAINE HYDROCHLORIDE 2.5 MG/ML
8 INJECTION, SOLUTION EPIDURAL; INFILTRATION; INTRACAUDAL ONCE
Status: COMPLETED | OUTPATIENT
Start: 2021-11-22 | End: 2021-11-22

## 2021-11-22 NOTE — PROGRESS NOTES
[facility]  11/22/2021      Procedure:  Trigger Point Injections  NOTES:   Bilateral upper trapezius, interscapular areas and bilateral occipitalis (B)    /60 (BP Location: Right arm, Patient Position: Sitting, Cuff Size: adult)

## 2021-12-13 ENCOUNTER — OFFICE VISIT (OUTPATIENT)
Dept: NEUROLOGY | Facility: CLINIC | Age: 56
End: 2021-12-13
Payer: COMMERCIAL

## 2021-12-13 VITALS
BODY MASS INDEX: 28.28 KG/M2 | DIASTOLIC BLOOD PRESSURE: 60 MMHG | HEART RATE: 76 BPM | SYSTOLIC BLOOD PRESSURE: 124 MMHG | WEIGHT: 176 LBS | HEIGHT: 66 IN | RESPIRATION RATE: 16 BRPM

## 2021-12-13 DIAGNOSIS — M79.18 MYOFASCIAL PAIN SYNDROME, CERVICAL: Primary | ICD-10-CM

## 2021-12-13 DIAGNOSIS — G43.009 MIGRAINE WITHOUT AURA AND WITHOUT STATUS MIGRAINOSUS, NOT INTRACTABLE: ICD-10-CM

## 2021-12-13 PROCEDURE — 3074F SYST BP LT 130 MM HG: CPT | Performed by: OTHER

## 2021-12-13 PROCEDURE — 3008F BODY MASS INDEX DOCD: CPT | Performed by: OTHER

## 2021-12-13 PROCEDURE — 20553 NJX 1/MLT TRIGGER POINTS 3/>: CPT | Performed by: OTHER

## 2021-12-13 PROCEDURE — 3078F DIAST BP <80 MM HG: CPT | Performed by: OTHER

## 2021-12-13 RX ORDER — BUPIVACAINE HYDROCHLORIDE 2.5 MG/ML
8 INJECTION, SOLUTION EPIDURAL; INFILTRATION; INTRACAUDAL ONCE
Status: COMPLETED | OUTPATIENT
Start: 2021-12-13 | End: 2021-12-13

## 2021-12-13 RX ORDER — TRIAMCINOLONE ACETONIDE 40 MG/ML
40 INJECTION, SUSPENSION INTRA-ARTICULAR; INTRAMUSCULAR ONCE
Status: COMPLETED | OUTPATIENT
Start: 2021-12-13 | End: 2021-12-13

## 2021-12-13 NOTE — PROGRESS NOTES
[facility] [place] [facility]  12/13/2021      Procedure:  Trigger Point Injections  NOTES:   Bilateral mid cervical, bilateral upper trapezius and bilateral levator scapula injected. Additional injection sites were in the bilateral rhomboids.     /

## 2023-01-11 ENCOUNTER — APPOINTMENT (RX ONLY)
Dept: URBAN - METROPOLITAN AREA CLINIC 125 | Facility: CLINIC | Age: 58
Setting detail: DERMATOLOGY
End: 2023-01-11

## 2023-01-11 DIAGNOSIS — I78.1 NEVUS, NON-NEOPLASTIC: ICD-10-CM

## 2023-01-11 DIAGNOSIS — L85.3 XEROSIS CUTIS: ICD-10-CM

## 2023-01-11 DIAGNOSIS — Z71.89 OTHER SPECIFIED COUNSELING: ICD-10-CM

## 2023-01-11 PROCEDURE — ? COUNSELING

## 2023-01-11 PROCEDURE — ? SUNSCREEN RECOMMENDATIONS

## 2023-01-11 PROCEDURE — ? TREATMENT REGIMEN

## 2023-01-11 PROCEDURE — 99203 OFFICE O/P NEW LOW 30 MIN: CPT

## 2023-01-11 ASSESSMENT — LOCATION DETAILED DESCRIPTION DERM: LOCATION DETAILED: NASAL DORSUM

## 2023-01-11 ASSESSMENT — LOCATION SIMPLE DESCRIPTION DERM: LOCATION SIMPLE: NOSE

## 2023-01-11 ASSESSMENT — LOCATION ZONE DERM: LOCATION ZONE: NOSE

## 2023-01-11 NOTE — PROCEDURE: SUNSCREEN RECOMMENDATIONS
General Sunscreen Counseling: I recommended a broad spectrum sunscreen with a SPF of 30 or higher when outdoors for more than 15 minutes.  Higher SPF of 70 or greater is recommended for intense or prolonged sun exposure. Sunscreens should be applied at least 15 minutes prior to expected sun exposure and then every 2 hours after that as long as sun exposure continues. If swimming or exercising sunscreen should be reapplied every 45 minutes to an hour after getting wet or sweating.  One ounce, or the equivalent of a shot glass full of sunscreen, is adequate to protect the skin not covered by a bathing suit. Sun protective clothing can be used in lieu of sunscreen but must be worn the entire time you are exposed to the sun's rays.
Detail Level: Detailed
Products Recommended: MAHENDRA GARCÍA of Melanoma bookmark provided

## (undated) NOTE — LETTER
Patient Name: Karen Kevin        : 10/18/1965       Medical Record #: [id number]    CONSENT FOR PROCEDURES/SEDATION    Date: 2019       Time: 1:05 PM        1.  I authorize the performance upon Karen Kevin the following:  CERVICAL TRIGGER PO

## (undated) NOTE — MR AVS SNAPSHOT
[address] [facility] [address] [phone]               Thank you for choosing us for your health care visit with Swapnil Baltazra MD.  We are glad to serve you and happy to provide you with this summary of your v family member will be picking up prescription, office must be given name of individual in advance and they must present an ID as well. ? The name of the person picking up your prescription must be documented in your chart.   Scheduling Tests    If your phy [phone]            Apr 14, 2017  9:40 AM   Injection with Manuel Garay MD   [facility] ([facility])    [address] [address] [phone]              Allergies as of Jan 09, 2017     Prednisone Beni Call [phone] for help. [employer] is NOT to be used for urgent needs. For medical emergencies, dial 911.            Visit [facility] online at  [employer]

## (undated) NOTE — MR AVS SNAPSHOT
[address] [facility] [address] [phone]               Thank you for choosing us for your health care visit with Abdoul Bowman MD.  We are glad to serve you and happy to provide you with this summary of your v family member will be picking up prescription, office must be given name of individual in advance and they must present an ID as well. ? The name of the person picking up your prescription must be documented in your chart.   Scheduling Tests    If your phy [address] [address]   [phone]            Apr 14, 2017  9:40 AM   Injection with Ellouise Sandhoff, MD   [address] [facility] ([address] [address] [phone]              Allergies as of Feb 15 For medical emergencies, dial 911.            Visit [facility] online at  [email or web address]

## (undated) NOTE — MR AVS SNAPSHOT
[facility] [address] [facility] [address] [phone]               Thank you for choosing us for your health care visit with Manda Knutson MD.  We are glad to serve you and happy to provide you with this summary of your v PRESCRIPTIONS. ? Written prescriptions must be picked up in office. ? Please allow the office 48-72 hours to fill the prescription. ? Patient must present photo ID at time of .       Scheduling Tests    If your physician has ordered radiology mary Take 1 tablet (750 mg total) by mouth 2 (two) times daily. Nortriptyline HCl 10 MG Caps   Take 1 capsule (10 mg total) by mouth nightly. Commonly known as:  PAMELOR           Olopatadine HCl 0.1 % Soln   Place 1 drop into both eyes daily.    Com